# Patient Record
Sex: MALE | URBAN - METROPOLITAN AREA
[De-identification: names, ages, dates, MRNs, and addresses within clinical notes are randomized per-mention and may not be internally consistent; named-entity substitution may affect disease eponyms.]

---

## 2017-09-22 ENCOUNTER — OFFICE VISIT (OUTPATIENT)
Dept: FAMILY MEDICINE CLINIC | Facility: CLINIC | Age: 47
End: 2017-09-22

## 2017-09-22 VITALS
HEART RATE: 86 BPM | SYSTOLIC BLOOD PRESSURE: 188 MMHG | DIASTOLIC BLOOD PRESSURE: 140 MMHG | WEIGHT: 221.63 LBS | BODY MASS INDEX: 33.98 KG/M2 | HEIGHT: 67.72 IN

## 2017-09-22 DIAGNOSIS — I10 ESSENTIAL HYPERTENSION: Primary | ICD-10-CM

## 2017-09-22 PROCEDURE — 99203 OFFICE O/P NEW LOW 30 MIN: CPT | Performed by: FAMILY MEDICINE

## 2017-09-22 RX ORDER — NIFEDIPINE 60 MG/1
60 TABLET, FILM COATED, EXTENDED RELEASE ORAL DAILY
Qty: 30 TABLET | Refills: 0 | Status: SHIPPED | OUTPATIENT
Start: 2017-09-22

## 2017-09-22 RX ORDER — METOPROLOL TARTRATE 50 MG/1
50 TABLET, FILM COATED ORAL 2 TIMES DAILY
Qty: 60 TABLET | Refills: 0 | Status: SHIPPED | OUTPATIENT
Start: 2017-09-22

## 2017-09-22 NOTE — PATIENT INSTRUCTIONS
-- restart metoprolol 50mg 2x/day  -- restart nifedipine 60mg daily  -- check BP at home, if not improving to <140/90, call us in 1 week  -- otherwise, followup in 2 weeks   -- go to [email or web address] to apply for [facility]

## 2017-09-22 NOTE — PROGRESS NOTES
Abbe De León is a 42year old male here for Patient presents with:  Establish Care: Rm 1: Hypertension      HPI:     Here to establish care    HTN  -has had BP issues for some time  -was prescribed medication in past   -has no insurance, so has not ha murmurs  Ext: full ROM  Psych: normal affect     ASSESSMENT/PLAN:     1.  Essential hypertension  -hypertensive urgency - no symptoms  -restart meds as previously prescribed - metoprolol and nifedipine - advised to start one med for 1-2 days, then add the s

## 2019-02-04 ENCOUNTER — TELEPHONE (OUTPATIENT)
Dept: FAMILY MEDICINE CLINIC | Facility: CLINIC | Age: 49
End: 2019-02-04

## 2020-02-25 ENCOUNTER — PATIENT OUTREACH (OUTPATIENT)
Dept: FAMILY MEDICINE CLINIC | Facility: CLINIC | Age: 50
End: 2020-02-25

## 2020-02-25 NOTE — PROGRESS NOTES
Patient is on Dr. Cordoba Locus list of patients that need follow up visit for either Well visit, HTN,     Patient was last seen 09/22/2017    Left message for patient call the clinic to schedule appointment for HTN follow up and Wellness visit     Letter sent t

## (undated) NOTE — LETTER
02/25/20    Dear Maine Li ,       In our system Dr. Chikis Kessler is shown to be your primary care provider, you are due for an Annual Wellness Visit. Can you please call our office to schedule appointment.      If you are being evaluated by another